# Patient Record
(demographics unavailable — no encounter records)

---

## 2024-11-27 NOTE — REASON FOR VISIT
[Follow-Up] : a follow-up evaluation of [[employer] ] : provided by [employer]   [Interpreters_IDNumber] : [id number] [TWNoteComboBox1] : [place]

## 2024-11-27 NOTE — PLAN
[FreeTextEntry1] : Book for D&C hysteroscopy for AUB. F/u 2 weeks after surgery for review of path and POC.

## 2024-11-27 NOTE — REASON FOR VISIT
[Follow-Up] : a follow-up evaluation of [[employer] ] : provided by [employer]   [Interpreters_IDNumber] : [id number] [TWNoteComboBox1] : [place]

## 2024-11-27 NOTE — HISTORY OF PRESENT ILLNESS
[FreeTextEntry1] : 40yo f/u for AUB, here to discuss results. Is still having bleeding, although bleeding is minimal.  US: endometrium 5mm, 3.1 cm exophytic left fibroid, 3.4 left ovarian cyst w single thin septation  States she canceled D&C w other provider, would like to do with me. We discussed the process of D&C and hysteroscopy, r/b/a and pt would like to proceed.

## 2025-01-15 NOTE — HISTORY OF PRESENT ILLNESS
[FreeTextEntry1] : S/p D&C hysteroscopy for AUB in 12/17. Doing well, had normal period 1/7/25. Doing well, no pain, abnl discharge, f/c, vb.  Path reviewed, polypoid fragments of proliferative endometrium, benign.

## 2025-06-11 NOTE — HISTORY OF PRESENT ILLNESS
[FreeTextEntry1] : Pakistani ID#[id number], [id number]  Periods have been normal and monthly but since 6/1, has not stopped bleeding. LMP before then was 5/1  Bleeding has been light, 1 pad per day. No lightheadedness,dizziness, denies other symptoms. Last unprotected sex prior to this most recent episode of bleeding, ~end of May.  Hx of AUB s/p D&C on 12/2024 pap 11/2024 NILM, -[facility]

## 2025-06-11 NOTE — PHYSICAL EXAM
[MA] : MA [Appropriately responsive] : appropriately responsive [Alert] : alert [No Acute Distress] : no acute distress [Soft] : soft [Non-tender] : non-tender [Non-distended] : non-distended [No HSM] : No HSM [No Lesions] : no lesions [No Mass] : no mass [Oriented x3] : oriented x3 [Labia Majora] : normal [Labia Minora] : normal [Old Blood] : old blood was present in the vagina [Scant] : There was scant vaginal bleeding [Normal] : normal [Anteversion] : anteverted [Uterine Adnexae] : non-palpable [FreeTextEntry2] : Zoe [Tenderness] : nontender [Enlarged ___ wks] : not enlarged [Mass ___ cm] : no uterine mass was palpated

## 2025-06-11 NOTE — PLAN
[FreeTextEntry1] : 43yo w AUB - ref for pelvic sono - labs - TFTs, FSH, E2, hCG - vaginitis swab sent  RTC after sono and labs result

## 2025-06-11 NOTE — PHYSICAL EXAM
[MA] : MA [Appropriately responsive] : appropriately responsive [Alert] : alert [No Acute Distress] : no acute distress [Soft] : soft [Non-tender] : non-tender [Non-distended] : non-distended [No HSM] : No HSM [No Lesions] : no lesions [No Mass] : no mass [Oriented x3] : oriented x3 [Labia Majora] : normal [Labia Minora] : normal [Scant] : There was scant vaginal bleeding [Old Blood] : old blood was present in the vagina [Normal] : normal [Anteversion] : anteverted [Uterine Adnexae] : non-palpable [FreeTextEntry2] : Zoe [Tenderness] : nontender [Enlarged ___ wks] : not enlarged [Mass ___ cm] : no uterine mass was palpated

## 2025-06-11 NOTE — HISTORY OF PRESENT ILLNESS
[FreeTextEntry1] : [place] ID#[id number], [id number]  Periods have been normal and monthly but since 6/1, has not stopped bleeding. LMP before then was 5/1  Bleeding has been light, 1 pad per day. No lightheadedness,dizziness, denies other symptoms. Last unprotected sex prior to this most recent episode of bleeding, ~end of May.  Hx of AUB s/p D&C on 12/2024 pap 11/2024 NILM, -[facility]

## 2025-07-02 NOTE — PLAN
[FreeTextEntry1] : 43yo s/p MTXx2 for ectopic pregnancy. Here for f/u betaHCG. Vital signs stable and asymptomatic today. Discussed importance of trending bHCG to zero, must return weekly until then. Discussed ectopic rupture/ED precautions Discussed importance of pelvic rest until resolution of ectopic  Will do bHCG today. RTC in 1 wk.

## 2025-07-02 NOTE — PHYSICAL EXAM
[Appropriately responsive] : appropriately responsive [Alert] : alert [No Acute Distress] : no acute distress [Soft] : soft [Non-tender] : non-tender [Non-distended] : non-distended [No HSM] : No HSM [No Lesions] : no lesions [No Mass] : no mass [Oriented x3] : oriented x3 [FreeTextEntry7] : non tender, non distended, no rebound or guarding

## 2025-07-02 NOTE — HISTORY OF PRESENT ILLNESS
[FreeTextEntry1] : 43yo w ectopic pregnancy s/p MTX on 6/17 and 6/23. Here for f/u.  CG (6/11) 796 --> (6/17 - day 1 MTX#1) 534 --> (6/20 - day 4) 546 --> (6/23 day 7 - MTX#2) 502 --> (6/30 - day 14)  179  Today denies any pain, lightheadedness, dizziness. Having minimal vaginal spotting. Otherwise no complaints.

## 2025-07-09 NOTE — PLAN
[FreeTextEntry1] : s/p MTXx2 for ectopic pregnancy. here for beta check. continue w pelvic rest, no sex discussed that if bHCG were to plateau or increase may require surgery pain/bleeding precautions reviewed  will draw bHCG today, RTC in 1 week

## 2025-07-09 NOTE — HISTORY OF PRESENT ILLNESS
[FreeTextEntry1] : ectopic pregnancy s/p MTX x2, here for serial beta hCG. Doing well today, denies any pain. States still having some bleeding but minimal, less than last week. No other complaints.

## 2025-07-16 NOTE — HISTORY OF PRESENT ILLNESS
[FreeTextEntry1] : 43yo w ectopic pregnancy s/p MTX on 6/17 and 6/23. Here for f/u bHCG draw.  bHCG (6/11) 796 --> (6/17 - day 1 MTX#1) 534 --> (6/20 - day 4) 546 --> (6/23 day 7 - MTX#2) 502 --> (6/30 - day 14) 179 -> (7/2) 168 --> (7/9) 32  Endorses vaginal spotting. Otherwise denies pain, lightheadedness, dizziness. No complaints.

## 2025-07-16 NOTE — PLAN
[FreeTextEntry1] : 43yo s/p MTX x2. Here for weekly beta HCG monitoring. - repeat hCG today RTC in 1 wk